# Patient Record
Sex: MALE | Race: WHITE | NOT HISPANIC OR LATINO | Employment: FULL TIME | URBAN - METROPOLITAN AREA
[De-identification: names, ages, dates, MRNs, and addresses within clinical notes are randomized per-mention and may not be internally consistent; named-entity substitution may affect disease eponyms.]

---

## 2020-04-04 ENCOUNTER — APPOINTMENT (OUTPATIENT)
Dept: GENERAL RADIOLOGY | Facility: CLINIC | Age: 65
End: 2020-04-04
Attending: EMERGENCY MEDICINE
Payer: COMMERCIAL

## 2020-04-04 ENCOUNTER — HOSPITAL ENCOUNTER (EMERGENCY)
Facility: CLINIC | Age: 65
Discharge: HOME | End: 2020-04-04
Attending: EMERGENCY MEDICINE | Admitting: EMERGENCY MEDICINE
Payer: COMMERCIAL

## 2020-04-04 VITALS
RESPIRATION RATE: 15 BRPM | HEART RATE: 81 BPM | DIASTOLIC BLOOD PRESSURE: 71 MMHG | SYSTOLIC BLOOD PRESSURE: 120 MMHG | TEMPERATURE: 99.8 F | OXYGEN SATURATION: 99 %

## 2020-04-04 DIAGNOSIS — R68.83 CHILLS: ICD-10-CM

## 2020-04-04 DIAGNOSIS — R05.9 COUGH: ICD-10-CM

## 2020-04-04 LAB
ALBUMIN SERPL-MCNC: 3.3 G/DL (ref 3.4–5)
ALP SERPL-CCNC: 121 U/L (ref 40–150)
ALT SERPL W P-5'-P-CCNC: 34 U/L (ref 0–70)
ANION GAP SERPL CALCULATED.3IONS-SCNC: 4 MMOL/L (ref 3–14)
AST SERPL W P-5'-P-CCNC: 22 U/L (ref 0–45)
BASOPHILS # BLD AUTO: 0 10E9/L (ref 0–0.2)
BASOPHILS NFR BLD AUTO: 0.2 %
BILIRUB SERPL-MCNC: 0.7 MG/DL (ref 0.2–1.3)
BUN SERPL-MCNC: 16 MG/DL (ref 7–30)
CALCIUM SERPL-MCNC: 9.2 MG/DL (ref 8.5–10.1)
CHLORIDE SERPL-SCNC: 103 MMOL/L (ref 94–109)
CO2 SERPL-SCNC: 27 MMOL/L (ref 20–32)
CREAT SERPL-MCNC: 0.83 MG/DL (ref 0.66–1.25)
DIFFERENTIAL METHOD BLD: NORMAL
EOSINOPHIL # BLD AUTO: 0 10E9/L (ref 0–0.7)
EOSINOPHIL NFR BLD AUTO: 0.2 %
ERYTHROCYTE [DISTWIDTH] IN BLOOD BY AUTOMATED COUNT: 12.2 % (ref 10–15)
GFR SERPL CREATININE-BSD FRML MDRD: >90 ML/MIN/{1.73_M2}
GLUCOSE SERPL-MCNC: 112 MG/DL (ref 70–99)
HCT VFR BLD AUTO: 50.6 % (ref 40–53)
HGB BLD-MCNC: 16.7 G/DL (ref 13.3–17.7)
IMM GRANULOCYTES # BLD: 0 10E9/L (ref 0–0.4)
IMM GRANULOCYTES NFR BLD: 0.5 %
LACTATE BLD-SCNC: 0.8 MMOL/L (ref 0.7–2)
LIPASE SERPL-CCNC: 97 U/L (ref 73–393)
LYMPHOCYTES # BLD AUTO: 0.8 10E9/L (ref 0.8–5.3)
LYMPHOCYTES NFR BLD AUTO: 13.3 %
MCH RBC QN AUTO: 30.7 PG (ref 26.5–33)
MCHC RBC AUTO-ENTMCNC: 33 G/DL (ref 31.5–36.5)
MCV RBC AUTO: 93 FL (ref 78–100)
MONOCYTES # BLD AUTO: 0.7 10E9/L (ref 0–1.3)
MONOCYTES NFR BLD AUTO: 11.1 %
NEUTROPHILS # BLD AUTO: 4.4 10E9/L (ref 1.6–8.3)
NEUTROPHILS NFR BLD AUTO: 74.7 %
NRBC # BLD AUTO: 0 10*3/UL
NRBC BLD AUTO-RTO: 0 /100
PLATELET # BLD AUTO: 226 10E9/L (ref 150–450)
POTASSIUM SERPL-SCNC: 4.2 MMOL/L (ref 3.4–5.3)
PROT SERPL-MCNC: 7.3 G/DL (ref 6.8–8.8)
RBC # BLD AUTO: 5.44 10E12/L (ref 4.4–5.9)
SODIUM SERPL-SCNC: 134 MMOL/L (ref 133–144)
WBC # BLD AUTO: 5.9 10E9/L (ref 4–11)

## 2020-04-04 PROCEDURE — 99285 EMERGENCY DEPT VISIT HI MDM: CPT | Mod: 25

## 2020-04-04 PROCEDURE — 85025 COMPLETE CBC W/AUTO DIFF WBC: CPT | Performed by: EMERGENCY MEDICINE

## 2020-04-04 PROCEDURE — 71045 X-RAY EXAM CHEST 1 VIEW: CPT

## 2020-04-04 PROCEDURE — 80053 COMPREHEN METABOLIC PANEL: CPT | Performed by: EMERGENCY MEDICINE

## 2020-04-04 PROCEDURE — 83605 ASSAY OF LACTIC ACID: CPT | Performed by: EMERGENCY MEDICINE

## 2020-04-04 PROCEDURE — 25800030 ZZH RX IP 258 OP 636: Performed by: EMERGENCY MEDICINE

## 2020-04-04 PROCEDURE — 96360 HYDRATION IV INFUSION INIT: CPT

## 2020-04-04 PROCEDURE — 83690 ASSAY OF LIPASE: CPT | Performed by: EMERGENCY MEDICINE

## 2020-04-04 PROCEDURE — 96361 HYDRATE IV INFUSION ADD-ON: CPT

## 2020-04-04 PROCEDURE — 25000132 ZZH RX MED GY IP 250 OP 250 PS 637: Performed by: EMERGENCY MEDICINE

## 2020-04-04 PROCEDURE — 93005 ELECTROCARDIOGRAM TRACING: CPT

## 2020-04-04 RX ORDER — ACETAMINOPHEN 325 MG/1
975 TABLET ORAL ONCE
Status: COMPLETED | OUTPATIENT
Start: 2020-04-04 | End: 2020-04-04

## 2020-04-04 RX ORDER — SODIUM CHLORIDE 9 MG/ML
1000 INJECTION, SOLUTION INTRAVENOUS CONTINUOUS
Status: DISCONTINUED | OUTPATIENT
Start: 2020-04-04 | End: 2020-04-04 | Stop reason: HOSPADM

## 2020-04-04 RX ADMIN — ACETAMINOPHEN 975 MG: 325 TABLET, FILM COATED ORAL at 12:39

## 2020-04-04 RX ADMIN — SODIUM CHLORIDE 1000 ML: 9 INJECTION, SOLUTION INTRAVENOUS at 12:39

## 2020-04-04 NOTE — ED PROVIDER NOTES
"  History     Chief Complaint:  Cough    The history is provided by the patient and the EMS personnel.      Sonu Holt is a 64 year old male smoker with a known lung mass who presents with productive cough with small green sputum that began on 4 days ago and associated chills. Patient reports he works as a  and had a contact with a co-worker who was also ill preceding his onset of symptoms. Patient reports that he began to then develop a decreased appetite and diarrhea and was up all night diaphoretic with chills. He also initially reported chest pain though secondary to the cough, but now states that overall, he's feeling better and no longer has a cough. As he lives with his daughter and grandchildren, he was prompted to present. He reports mild right lower quadrant tenderness and difficulty sleeping, but denies recent travel or shortness of breath. Of note, he denies any daily medications, but states he \"takes marijuana and beer.\"     Allergies:  No Known Allergies     Medications:    No Known Medications    Past Medical History:    Lung Mass     Past Surgical History:    Hernia Repair    Family History:    Sister(s): Breast Cancer  Brother(s): Rheumatic Fever, coronary artery disease    Social History:  The patient was accompanied to the ED by EMS.  Smoking Status: No  Smokeless Tobacco: Never  Alcohol Use: Yes  Drug Use: Marijuana  Marital Status:  Single [1]    Review of Systems   All other systems reviewed and are negative.      Physical Exam     Patient Vitals for the past 24 hrs:   BP Temp Temp src Pulse Heart Rate Resp SpO2   04/04/20 1416 120/71 -- -- 81 -- -- --   04/04/20 1400 -- -- -- -- -- -- 99 %   04/04/20 1316 -- -- -- -- 81 15 97 %   04/04/20 1315 125/83 -- -- 86 81 15 97 %   04/04/20 1300 123/86 -- -- 88 87 18 98 %   04/04/20 1215 130/80 99.8  F (37.7  C) Oral 97 -- 16 96 %       Physical Exam  General: Resting on the bed.  Head: No obvious trauma to head.  Ears, Nose, Throat:  " External ears normal.  Nose normal.  No pharyngeal erythema, swelling or exudate.  Midline uvula.    Eyes:  Conjunctivae clear.  Pupils are equal, round, and reactive.   Neck: Normal range of motion.  Neck supple.   CV: Regular rate and rhythm.  No murmurs.      Respiratory: Effort normal and breath sounds normal.  No wheezing or crackles.   Gastrointestinal: Soft.  No distension. There is no tenderness.  There is no rigidity, no rebound and no guarding.   Musculoskeletal: Non tender non edematous calves  Neuro: Alert. Moving all extremities appropriately.  Normal speech.    Skin: Skin is warm and dry.  No rash noted.       Emergency Department Course     ECG:  Indication: Cough  ECG taken at 1223, ECG read at 1225 by Dr. Daniel MD  Sinus rhythm with premature atrial complexes  Otherwise normal ECG  No significant changes compared to EKG dated 08/30/2016  Rate 93 bpm. WY interval 202. QRS duration 86. QT/QTc 342/425. P-R-T axes 51 11 18.    Imaging:  Radiology findings were communicated with the patient who voiced understanding of the findings.    XR Chest Port 1 View  IMPRESSION: No acute cardiopulmonary disease.  Reading per radiology    Laboratory:  Laboratory findings were communicated with the patient who voiced understanding of the findings.    CBC: AWNL (WBC 5.9, HGB 16.7, )  CMP: Glucose 112(H), Albumin 3.3(L) o/w WNL (Creatinine 0.83)  Lipase: 97  Lactic Acid (Resulted 1244): 0.8    Interventions:  (1239)   0.9% sodium chloride BOLUS 1000 mL IV    (1239)   Acetaminophen 975mg Oral    Emergency Department Course:  Past medical records, nursing notes, and vitals reviewed.    IV was inserted and blood was drawn for laboratory testing, results above.    (1221)   I performed an exam of the patient as documented above.     The patient was sent for a XR chest port 1 while in the emergency department, results above.    EKG obtained in the ED, see results above.     (1400)   I rechecked the patient and  discussed the results of his workup thus far. Plan of care was discussed and patient was discharged.    Findings and plan explained to the Patient. Patient discharged home with instructions regarding supportive care, medications, and reasons to return. The importance of close follow-up was reviewed.    I personally reviewed the laboratory and imaging results with the Patient and answered all related questions prior to discharge.     Impression & Plan     Medical Decision Makin-year-old male presents with cough and chills.  Vital signs initially low-grade temperature and mild tachycardia but this improved over the course of stay.  Broad differential was pursued including not limited to ACS, PE, pneumonia, pneumothorax, effusion, electrolyte, metabolic, renal dysfunction, hepatitis, pancreatitis, severe sepsis, septic shock, etc.  Overall patient has cough and chills.  He had some vague right lower quadrant abdominal tenderness but no reproducible pain on exam.  Abdominal labs were obtained.  CBC shows no leukocytosis or anemia.  BMP shows no acute electrolyte, metabolic, renal dysfunction.  LFTs and lipase are reassuring, not concerning for severe sepsis or septic shock.  Patient has no focal abdominal tenderness on exam to indicate need for CT scan or further imaging.  At this point I am reassured by exam and patient does not report any consistent or continued pain.  Chest x-ray shows no evidence acute pneumonia, pneumothorax or effusion.  Considered PE but no pleuritic chest pain, no hypoxia, no shortness of breath, no chest pain, does not appear consistent with PE especially in the setting of URI symptoms.  Considered ACS but EKG reassuring.  Additionally patient has no chest pain or shortness of breath.  No signs of acute ST-T wave changes.  Symptoms seem most consistent with URI.  Overall patient feels improved.  He was able to ambulate without hypoxia or tachycardia.  At this point we discussed  possibility of coronavirus and recommended self-isolation.  Coronavirus follow-up phone call was given and discharge paperwork.  Supportive cares are indicated.  Patient does not qualify for coronavirus testing.  At this point he does not require admission.  At this point I think that patient is recently safe for discharge.  Return precautions were discussed.  Patient was discharged home.    Diagnosis:    ICD-10-CM    1. Chills  R68.83 COVID-19 [facility] Referral   2. Cough  R05        Disposition:  Discharged to home.    Scribe Disclosure:  I, Matt Goodman, am serving as a scribe at 12:21 PM on 4/4/2020 to document services personally performed by Jeni Sanchez MD based on my observations and the provider's statements to me.   4/4/2020   [facility] EMERGENCY DEPARTMENT       Jeni Sanchez MD  04/04/20 7522

## 2020-04-04 NOTE — DISCHARGE INSTRUCTIONS
Please return to the ED if you have worsening cough, fevers >101, chest pain, shortness of breath, intractable vomiting, or other acute changes.  Please follow up with your PCP in the next 2-3 days.      Use tylenol and ibuprofen for pain.  Drink plenty of fluids and rest.      Discharge Instructions  COVID-19    Your Provider has determined that you should practice self-isolation and self-monitoring in order to protect yourself and your community from COVID-19, which is the disease caused by a new coronavirus. The virus spreads from person to person primarily by droplets when an infected person coughs or sneezes and the droplet either lands on another person or that other person touches a surface with the droplet on it. Diagnosis of COVID-19 can be made with a test but many times the test is unavailable or not necessary. There is no specific treatment or medicine for the disease.    Symptoms of COVID-19  Many people have no symptoms or mild symptoms.  Symptoms may usually appear 4 to 5 days (up to 14 days) after contact with another ill person. Some people will get severe symptoms and pneumonia. Usual symptoms are:     Fever    Cough    Trouble breathing    Less common symptoms are: Headache, body aches, sore throat, sneezing,               diarrhea.    How to Care for Yourself    Stay home  Most people will recover from illness with mild symptoms.  Isolation by staying home is the best method to prevent the spread of the illness. Do not go to work or school. Have a friend or relative do your shopping. Do not use public transportation (bus, train) or ridesharing (Lyft, Uber).    How long should I stay home?    If you have symptoms of a respiratory disease (fever, cough), you should stay home for at least 7 days, and for 3 days with no fever and improvement of respiratory symptoms--whichever is longer. (Your fever should be gone for 3 days without using fever-reducing medicine.)  For example, if you have a fever and  coughing for 4 days, you need to stay home 3 more days with no fever for a total of 7 days. Or, if you have a fever and coughing for 5 days, you need to stay home 3 more days with no fever for a total of 8 days.    Separate yourself from other people in your home.? As much as possible, you should stay in one room and away from other people in your home. Also, use a separate bathroom, if possible. Avoid handling pets or other animals while sick.     Wear a facemask: if you need to be around other people and cover your mouth and nose with a tissue when you cough or sneeze.     Avoid sharing personal household items. You should not share dishes, drinking glasses, forks/knives/spoons, towels, or bedding with other people in your home. After using these items, they should be washed with soap and water. Clean parts of your home that are touched often (doorknobs, faucets, countertops, etc.) daily.     Wash your hands often with soap and water for at least 20 seconds or use an alcohol-based hand  containing at least 60% alcohol.     Avoid touching your face    Treat your symptoms: Take over the counter medications like ibuprofen (Advil or Motrin) or Acetaminophen (Tylenol). Drink fluids. Rest.    Watch for worsening symptoms: shortness of breath, or difficulty breathing.    Return to the Emergency Department if:    If you are developing worsening breathing, shortness of breath, or feel worse you should seek medical attention.  If you are uncertain, contact your health care provider/clinic. If you need emergency medical attention, call 911 and tell them you have been ill.    Discharge Instructions  Bronchitis, Pneumonia, Bronchospasm    You were seen today for a chest infection or inflammation. If your provider decided this was due to a bacterial infection, you may need an antibiotic. Sometimes these are caused by a virus, and then an antibiotic will not help.     Generally, every Emergency Department visit should  have a follow-up clinic visit with either a primary or a specialty clinic/provider. Please follow-up as instructed by your emergency provider today.    Return to the Emergency Department if:  Your breathing gets much worse.  You are very weak, or feel much more ill.  You develop new symptoms, such as chest pain.  You cough up blood.  You are vomiting (throwing up) enough that you cannot keep fluids or your medicine down.    What can I do to help myself?  Fill any prescriptions the provider gave you and take them right away--especially antibiotics. Be sure to finish the whole antibiotic prescription.  You may be given a prescription for an inhaler, which can help loosen tight air passages.  Use this as needed, but not more often than directed. Inhalers work much better when used with a spacer.   You may be given a prescription for a steroid to reduce inflammation. Used long-term, these can have side effects, but for short-term use they are safe. You may notice restlessness or increased appetite.      You may use non-prescription cough or cold medicines. Cough medicines may help, but don t make the cough go away completely.   Avoid smoke, because this can make your symptoms worse. If you smoke, this may be a good time to quit! Consider using nicotine lozenges, gum, or patches to reduce cravings.   If you have a fever, Tylenol  (acetaminophen), Motrin  (ibuprofen), or Advil  (ibuprofen) may help bring fever down and may help you feel more comfortable. Be sure to read and follow the package directions, and ask your provider if you have questions.  Be sure to get your flu shot each year.  For certain ages, the pneumonia shot can help prevent pneumonia.  If you were given a prescription for medicine here today, be sure to read all of the information (including the package insert) that comes with your prescription.  This will include important information about the medicine, its side effects, and any warnings that you need  to know about.  The pharmacist who fills the prescription can provide more information and answer questions you may have about the medicine.  If you have questions or concerns that the pharmacist cannot address, please call or return to the Emergency Department.     Remember that you can always come back to the Emergency Department if you are not able to see your regular provider in the amount of time listed above, if you get any new symptoms, or if there is anything that worries you.

## 2020-04-04 NOTE — ED NOTES
Ambulated pt with pulse ox. Pt walked well through ER, quick pace, steady.  Denies SOB, dizziness,weakness. O2 maintained at 98%.

## 2020-04-04 NOTE — ED TRIAGE NOTES
"Patient arrived per EMS with 5 days of small green productive cough and complaints of \"chills\". Patient states that he had diarrhea yesterday, no appetite. Patient states he hasn't been able to sleep. Some right sided chest pain with cough along with RLQ abdominal pain. Increased fatigue.  "

## 2020-04-04 NOTE — ED NOTES
Bed: ED15  Expected date: 4/4/20  Expected time:   Means of arrival: Ambulance  Comments:  belgica Colby

## 2020-04-04 NOTE — ED AVS SNAPSHOT
[facility] Emergency Department  [address]  [facility] [address]  Phone:  [phone]  Fax:  [phone]                                    Sonu Holt   MRN: [id number]    Department:  [facility] Emergency Department   Date of Visit:  4/4/2020           After Visit Summary Signature Page    I have received my discharge instructions, and my questions have been answered. I have discussed any challenges I see with this plan with the nurse or doctor.    ..........................................................................................................................................  Patient/Patient Representative Signature      ..........................................................................................................................................  Patient Representative Print Name and Relationship to Patient    ..................................................               ................................................  Date                                   Time    ..........................................................................................................................................  Reviewed by Signature/Title    ...................................................              ..............................................  Date                                               Time          22EPIC Rev 08/18

## 2020-04-05 LAB — INTERPRETATION ECG - MUSE: NORMAL

## 2020-04-06 ENCOUNTER — NURSE TRIAGE (OUTPATIENT)
Dept: NURSING | Facility: CLINIC | Age: 65
End: 2020-04-06

## 2020-04-06 NOTE — TELEPHONE ENCOUNTER
Pt states he was told to schedule an appointment with a FP provider on 4/7/2020 following an ED visit on 04/04/2020.  Pt feels better today, no new symptoms.      Disposition:  Schedule a telephone visit with clinic provider for 04/07/2020.  He verbalized understanding and had no further questions. Call transferred to scheduling.     Geraldine Geller RN/TERRIE    Reason for Disposition    Requesting regular office appointment    Additional Information    Negative: Lab calling with strep throat test results and triager can call in prescription    Negative: Lab calling with urinalysis test results and triager can call in prescription    Negative: Medication questions    Negative: ED call to PCP    Negative: Physician call to PCP    Negative: Call about patient who is currently hospitalized    Negative: Lab or radiology calling with CRITICAL test results    Negative: [1] Prescription not at pharmacy AND [2] was prescribed today by PCP    Negative: [1] Follow-up call from patient regarding patient's clinical status AND [2] information urgent    Negative: [1] Caller requests to speak ONLY to PCP AND [2] URGENT question    Negative: [1] Caller requests to speak to PCP now AND [2] won't tell us reason for call  (Exception: if 10 pm to 6 am, caller must first discuss reason for the call)    Negative: Notification of hospital admission    Negative: Notification of death    Negative: Caller requesting lab results    Negative: Lab or radiology calling with test results    Negative: [1] Follow-up call from patient regarding patient's clinical status AND [2] information NON-URGENT    Negative: [1] Caller requests to speak ONLY to PCP AND [2] NON-URGENT question    Negative: Caller requesting an appointment, triage offered and declined    Negative: [1] Other NON-URGENT information for PCP AND [2] does not require PCP response    Negative: [1] Caller is not with the adult (patient) AND [2] reporting urgent symptoms    Negative: Lab  result questions    Negative: Medication questions    Negative: Caller can't be reached by phone    Negative: Caller has already spoken to PCP or another triager    Negative: RN needs further essential information from caller in order to complete triage    Protocols used: INFORMATION ONLY CALL-A-AH, PCP CALL - NO TRIAGE-A-AH

## 2020-04-07 ENCOUNTER — VIRTUAL VISIT (OUTPATIENT)
Dept: FAMILY MEDICINE | Facility: CLINIC | Age: 65
End: 2020-04-07
Payer: COMMERCIAL

## 2020-04-07 DIAGNOSIS — R05.9 COUGH: Primary | ICD-10-CM

## 2020-04-07 PROCEDURE — 99202 OFFICE O/P NEW SF 15 MIN: CPT | Mod: TEL | Performed by: FAMILY MEDICINE

## 2020-04-07 ASSESSMENT — ENCOUNTER SYMPTOMS
FEVER: 0
COUGH: 0
SHORTNESS OF BREATH: 0

## 2020-04-07 NOTE — PROGRESS NOTES
"Subjective     Sonu Holt is a 64 year old male who is being evaluated via a billable telephone visit.      The patient has been notified of following:     \"This telephone visit will be conducted via a call between you and your physician/provider. We have found that certain health care needs can be provided without the need for a physical exam.  This service lets us provide the care you need with a short phone conversation.  If a prescription is necessary we can send it directly to your pharmacy.  If lab work is needed we can place an order for that and you can then stop by our lab to have the test done at a later time.    If during the course of the call the physician/provider feels a telephone visit is not appropriate, you will not be charged for this service.\"     Patient has given verbal consent for Telephone visit?  Yes    Sonu Holt complains of No chief complaint on file.      ALLERGIES  Patient has no known allergies.    ED/UC Followup:    Facility: [facility]  Date of visit: 4/4/20  Reason for visit: cough and chills  Current Status: is better       Symptoms resolved. No additional concerns.  States he just had a bad night; got concerned for chest pain and cough. Negative ACS and pneumonia workup. Daily alcohol drinker, 3-5 a day; denies symptoms of dizziness, confusion, agitation when not drinking.  Occasional marijuana user.    Reviewed and updated as needed this visit by Provider         Review of Systems   Constitutional: Negative for fever.   Respiratory: Negative for cough and shortness of breath.    Cardiovascular: Negative for chest pain.          Objective     No conversational dyspnea.  Clear and coherent.    Diagnostic Test Results:  Labs reviewed in Epic        Assessment/Plan:   1. Cough  Resolved.  Advised to continue to monitor symptoms and if worse, contact us to let us know.  Otherwise encouraged to get preventative annual exam later in the year.       Return in about 5 days (around " 4/12/2020) for If symptoms do not improve or gets worse..      Phone call duration:  7 minutes    Mark Timmons MD

## 2021-07-08 PROCEDURE — 99285 EMERGENCY DEPT VISIT HI MDM: CPT | Mod: 25

## 2021-07-08 PROCEDURE — 96374 THER/PROPH/DIAG INJ IV PUSH: CPT

## 2021-07-08 PROCEDURE — 96375 TX/PRO/DX INJ NEW DRUG ADDON: CPT

## 2021-07-08 PROCEDURE — 96361 HYDRATE IV INFUSION ADD-ON: CPT

## 2021-07-09 ENCOUNTER — APPOINTMENT (OUTPATIENT)
Dept: CT IMAGING | Facility: CLINIC | Age: 66
End: 2021-07-09
Attending: EMERGENCY MEDICINE
Payer: COMMERCIAL

## 2021-07-09 ENCOUNTER — HOSPITAL ENCOUNTER (EMERGENCY)
Facility: CLINIC | Age: 66
Discharge: HOME | End: 2021-07-09
Attending: EMERGENCY MEDICINE | Admitting: EMERGENCY MEDICINE
Payer: COMMERCIAL

## 2021-07-09 VITALS
OXYGEN SATURATION: 100 % | RESPIRATION RATE: 20 BRPM | HEART RATE: 76 BPM | DIASTOLIC BLOOD PRESSURE: 87 MMHG | TEMPERATURE: 98.4 F | SYSTOLIC BLOOD PRESSURE: 113 MMHG

## 2021-07-09 DIAGNOSIS — R31.9 URINARY TRACT INFECTION WITH HEMATURIA, SITE UNSPECIFIED: ICD-10-CM

## 2021-07-09 DIAGNOSIS — K80.80 BILIARY CALCULUS OF OTHER SITE WITHOUT OBSTRUCTION: ICD-10-CM

## 2021-07-09 DIAGNOSIS — N39.0 URINARY TRACT INFECTION WITH HEMATURIA, SITE UNSPECIFIED: ICD-10-CM

## 2021-07-09 LAB
ALBUMIN SERPL-MCNC: 4 G/DL (ref 3.4–5)
ALBUMIN UR-MCNC: 10 MG/DL
ALP SERPL-CCNC: 68 U/L (ref 40–150)
ALT SERPL W P-5'-P-CCNC: 38 U/L (ref 0–70)
ANION GAP SERPL CALCULATED.3IONS-SCNC: 3 MMOL/L (ref 3–14)
APPEARANCE UR: CLEAR
AST SERPL W P-5'-P-CCNC: 24 U/L (ref 0–45)
BASOPHILS # BLD AUTO: 0 10E9/L (ref 0–0.2)
BASOPHILS NFR BLD AUTO: 0.3 %
BILIRUB DIRECT SERPL-MCNC: 0.1 MG/DL (ref 0–0.2)
BILIRUB SERPL-MCNC: 0.5 MG/DL (ref 0.2–1.3)
BILIRUB UR QL STRIP: NEGATIVE
BUN SERPL-MCNC: 14 MG/DL (ref 7–30)
CALCIUM SERPL-MCNC: 9.2 MG/DL (ref 8.5–10.1)
CHLORIDE SERPL-SCNC: 107 MMOL/L (ref 94–109)
CO2 SERPL-SCNC: 29 MMOL/L (ref 20–32)
COLOR UR AUTO: YELLOW
CREAT SERPL-MCNC: 0.87 MG/DL (ref 0.66–1.25)
DIFFERENTIAL METHOD BLD: NORMAL
EOSINOPHIL # BLD AUTO: 0 10E9/L (ref 0–0.7)
EOSINOPHIL NFR BLD AUTO: 0.4 %
ERYTHROCYTE [DISTWIDTH] IN BLOOD BY AUTOMATED COUNT: 13 % (ref 10–15)
GFR SERPL CREATININE-BSD FRML MDRD: >90 ML/MIN/{1.73_M2}
GLUCOSE SERPL-MCNC: 123 MG/DL (ref 70–99)
GLUCOSE UR STRIP-MCNC: NEGATIVE MG/DL
HCT VFR BLD AUTO: 46 % (ref 40–53)
HGB BLD-MCNC: 14.9 G/DL (ref 13.3–17.7)
HGB UR QL STRIP: NEGATIVE
IMM GRANULOCYTES # BLD: 0 10E9/L (ref 0–0.4)
IMM GRANULOCYTES NFR BLD: 0.3 %
KETONES UR STRIP-MCNC: 10 MG/DL
LEUKOCYTE ESTERASE UR QL STRIP: ABNORMAL
LIPASE SERPL-CCNC: 75 U/L (ref 73–393)
LYMPHOCYTES # BLD AUTO: 0.9 10E9/L (ref 0.8–5.3)
LYMPHOCYTES NFR BLD AUTO: 9.6 %
MCH RBC QN AUTO: 31.9 PG (ref 26.5–33)
MCHC RBC AUTO-ENTMCNC: 32.4 G/DL (ref 31.5–36.5)
MCV RBC AUTO: 99 FL (ref 78–100)
MONOCYTES # BLD AUTO: 0.5 10E9/L (ref 0–1.3)
MONOCYTES NFR BLD AUTO: 5.4 %
MUCOUS THREADS #/AREA URNS LPF: PRESENT /LPF
NEUTROPHILS # BLD AUTO: 7.9 10E9/L (ref 1.6–8.3)
NEUTROPHILS NFR BLD AUTO: 84 %
NITRATE UR QL: NEGATIVE
NRBC # BLD AUTO: 0 10*3/UL
NRBC BLD AUTO-RTO: 0 /100
PH UR STRIP: 6 PH (ref 5–7)
PLATELET # BLD AUTO: 202 10E9/L (ref 150–450)
POTASSIUM SERPL-SCNC: 4.2 MMOL/L (ref 3.4–5.3)
PROT SERPL-MCNC: 6.9 G/DL (ref 6.8–8.8)
RBC # BLD AUTO: 4.67 10E12/L (ref 4.4–5.9)
RBC #/AREA URNS AUTO: 9 /HPF (ref 0–2)
SODIUM SERPL-SCNC: 139 MMOL/L (ref 133–144)
SOURCE: ABNORMAL
SP GR UR STRIP: 1.03 (ref 1–1.03)
SQUAMOUS #/AREA URNS AUTO: 2 /HPF (ref 0–1)
UROBILINOGEN UR STRIP-MCNC: NORMAL MG/DL (ref 0–2)
WBC # BLD AUTO: 9.4 10E9/L (ref 4–11)
WBC #/AREA URNS AUTO: 10 /HPF (ref 0–5)

## 2021-07-09 PROCEDURE — 81001 URINALYSIS AUTO W/SCOPE: CPT | Performed by: EMERGENCY MEDICINE

## 2021-07-09 PROCEDURE — 87086 URINE CULTURE/COLONY COUNT: CPT | Performed by: EMERGENCY MEDICINE

## 2021-07-09 PROCEDURE — 258N000003 HC RX IP 258 OP 636: Performed by: EMERGENCY MEDICINE

## 2021-07-09 PROCEDURE — 83690 ASSAY OF LIPASE: CPT | Performed by: EMERGENCY MEDICINE

## 2021-07-09 PROCEDURE — 85025 COMPLETE CBC W/AUTO DIFF WBC: CPT | Performed by: EMERGENCY MEDICINE

## 2021-07-09 PROCEDURE — 74176 CT ABD & PELVIS W/O CONTRAST: CPT

## 2021-07-09 PROCEDURE — 250N000011 HC RX IP 250 OP 636: Performed by: EMERGENCY MEDICINE

## 2021-07-09 PROCEDURE — 80076 HEPATIC FUNCTION PANEL: CPT | Performed by: EMERGENCY MEDICINE

## 2021-07-09 PROCEDURE — 80048 BASIC METABOLIC PNL TOTAL CA: CPT | Performed by: EMERGENCY MEDICINE

## 2021-07-09 RX ORDER — OXYCODONE HYDROCHLORIDE 5 MG/1
5 TABLET ORAL EVERY 6 HOURS PRN
Qty: 8 TABLET | Refills: 0 | Status: SHIPPED | OUTPATIENT
Start: 2021-07-09 | End: 2023-02-10

## 2021-07-09 RX ORDER — HYDROMORPHONE HYDROCHLORIDE 1 MG/ML
0.5 INJECTION, SOLUTION INTRAMUSCULAR; INTRAVENOUS; SUBCUTANEOUS ONCE
Status: COMPLETED | OUTPATIENT
Start: 2021-07-09 | End: 2021-07-09

## 2021-07-09 RX ORDER — CEPHALEXIN 500 MG/1
500 CAPSULE ORAL 2 TIMES DAILY
Qty: 20 CAPSULE | Refills: 0 | Status: SHIPPED | OUTPATIENT
Start: 2021-07-09 | End: 2021-07-19

## 2021-07-09 RX ORDER — ONDANSETRON 2 MG/ML
4 INJECTION INTRAMUSCULAR; INTRAVENOUS ONCE
Status: COMPLETED | OUTPATIENT
Start: 2021-07-09 | End: 2021-07-09

## 2021-07-09 RX ORDER — LIDOCAINE HYDROCHLORIDE 10 MG/ML
INJECTION, SOLUTION EPIDURAL; INFILTRATION; INTRACAUDAL; PERINEURAL
Status: DISCONTINUED
Start: 2021-07-09 | End: 2021-07-09 | Stop reason: HOSPADM

## 2021-07-09 RX ORDER — KETOROLAC TROMETHAMINE 15 MG/ML
10 INJECTION, SOLUTION INTRAMUSCULAR; INTRAVENOUS ONCE
Status: COMPLETED | OUTPATIENT
Start: 2021-07-09 | End: 2021-07-09

## 2021-07-09 RX ORDER — ONDANSETRON 4 MG/1
4 TABLET, ORALLY DISINTEGRATING ORAL EVERY 8 HOURS PRN
Qty: 10 TABLET | Refills: 0 | Status: SHIPPED | OUTPATIENT
Start: 2021-07-09 | End: 2021-07-12

## 2021-07-09 RX ADMIN — HYDROMORPHONE HYDROCHLORIDE 0.5 MG: 1 INJECTION, SOLUTION INTRAMUSCULAR; INTRAVENOUS; SUBCUTANEOUS at 02:31

## 2021-07-09 RX ADMIN — SODIUM CHLORIDE 1000 ML: 9 INJECTION, SOLUTION INTRAVENOUS at 02:30

## 2021-07-09 RX ADMIN — KETOROLAC TROMETHAMINE 10 MG: 15 INJECTION, SOLUTION INTRAMUSCULAR; INTRAVENOUS at 00:59

## 2021-07-09 RX ADMIN — ONDANSETRON 4 MG: 2 INJECTION INTRAMUSCULAR; INTRAVENOUS at 02:30

## 2021-07-09 ASSESSMENT — ENCOUNTER SYMPTOMS
FEVER: 1
NAUSEA: 0
SHORTNESS OF BREATH: 0
FLANK PAIN: 1
NUMBNESS: 0
VOMITING: 0
DYSURIA: 0
DIARRHEA: 0

## 2021-07-09 NOTE — ED PROVIDER NOTES
History   Chief Complaint:  Flank Pain       The history is provided by the patient.      Sonu Holt is an otherwise healthy 66 year old male who presents for evaluation of left flank pain. Sonu developed left flank pain which radiates to the groin yesterday at midnight which kept him up. Pain is intermittent and nothing makes it better or worse. He took ibuprofen, Aleve, and muscle relaxers without relief. He felt somewhat feverish with no measured fever. No nausea or vomiting. No chest pain, shortness of breath, diarrhea, or dysuria. No leg numbness or tingling. No history kidney stones.    Review of Systems   Constitutional: Positive for fever.   Respiratory: Negative for shortness of breath.    Cardiovascular: Negative for chest pain.   Gastrointestinal: Negative for diarrhea, nausea and vomiting.   Genitourinary: Positive for flank pain. Negative for dysuria.   Neurological: Negative for numbness.   All other systems reviewed and are negative.      Allergies:  No Known Allergies    Medications:  The patient is currently on no regular medications.     Past Medical History:    Lung mass    Past Surgical History:    Hernia repair    Family History:    Breast cancer  Coronary artery disease    Social History:  Presents unaccompanied   PCP: No Ref-Primary, Physician     Physical Exam     Patient Vitals for the past 24 hrs:   BP Temp Temp src Pulse Resp SpO2   07/09/21 0300 -- -- -- 61 -- 100 %   07/09/21 0245 113/87 -- -- 81 -- 97 %   07/09/21 0230 (!) 132/101 -- -- -- -- --   07/08/21 2218 (!) 139/93 98.4  F (36.9  C) Oral 90 20 98 %       Physical Exam  Nursing note and vitals reviewed.  Constitutional: Well nourished.   Eyes: Conjunctiva normal.  Pupils are equal, round, and reactive to light.   ENT: Nose normal. Mucous membranes pink and moist.    Neck: Normal range of motion.  CVS: Normal rate, regular rhythm.  Normal heart sounds.  No murmur.  Pulmonary: Lungs clear to auscultation bilaterally. No  wheezes/rales/rhonchi.  GI: Abdomen soft. Nontender, nondistended. No rigidity or guarding.  Mild L. CVA tenderness    MSK: No calf tenderness or swelling.  Neuro: Alert. Follows simple commands.  Skin: Skin is warm and dry. No rash noted.   Psychiatric: Normal affect.       Emergency Department Course     Imaging:  CT Abdomen Pelvis w/o IV contrast - Stone Protocol:   1.  No hydronephrosis or calcified, obstructing urinary tract stone. The bladder is empty.   2.  Moderate colonic diverticulosis.   3.  Cholelithiasis.  Per radiology.    Laboratory:  CBC: WBC 9.4, HGB 14.9,    BMP: Glucose: 123(H), o/w WNL (Creatinine 0.87)   Hepatic all WNL    Lipase: 75    UA with Microscopic: Urineketon: 10, Protein Albumin: 10, Leukocyte Esterase: small, WBC: 10(H), RBC: 9(H), Squamous Epithelial: 2(H), Mucous: present, o/w WNL   Urine Culture: In process     Emergency Department Course:    Reviewed:  I reviewed nursing notes, vitals, past medical history and care everywhere    Assessments:  0210 I obtained history and examined the patient as noted above.   0415 I rechecked the patient and explained findings.     Interventions:  0059 Toradol, 10 mg, IV  0230 0.9% sodium chloride bolus, 1,000 ml, IV  0230 Zofran, 4 mg, IV  0231 Dilaudid, 0.5 mg, IV    Disposition:  The patient was discharged to home.       Impression & Plan     Medical Decision Making:  Patient is a 66-year-old male presenting with predominately left flank pain.  He is nontoxic, clinically well-hydrated on arrival.  He did undergo formal CT imaging which is fortunately without evidence of obstructive uropathy.  Incidental cholelithiasis as well as diverticulosis identified and discussed with the patient.  He has no reproducible right upper quadrant tenderness on exam. No other intraabdominal catastrophe identified. UA does suggest concerns for possible infection.  Urine culture sent.  Given his presentation I do have concerns for possible early  pyelonephritis.  He feels comfortable with plans for close outpatient follow-up.  His pain was controlled during his time in the ED.  I will initiate antibiotics on dispo as well as analgesia and ODT Zofran.  We did discuss recommendation for close outpatient follow-up and to return for fever, worsening pain, intractable vomiting or should symptoms worsen or change.      Covid-19  Sonu Holt was evaluated during a global COVID-19 pandemic, which necessitated consideration that the patient might be at risk for infection with the SARS-CoV-2 virus that causes COVID-19.   Applicable protocols for evaluation were followed during the patient's care.   COVID-19 was considered as part of the patient's evaluation.     Diagnosis:    ICD-10-CM    1. Urinary tract infection with hematuria, site unspecified  N39.0     R31.9     concern for early pyelonephritis   2. Biliary calculus of other site without obstruction  K80.80        Discharge Medications:  New Prescriptions    CEPHALEXIN (KEFLEX) 500 MG CAPSULE    Take 1 capsule (500 mg) by mouth 2 times daily for 10 days    ONDANSETRON (ZOFRAN ODT) 4 MG ODT TAB    Take 1 tablet (4 mg) by mouth every 8 hours as needed for nausea    OXYCODONE (ROXICODONE) 5 MG TABLET    Take 1 tablet (5 mg) by mouth every 6 hours as needed for pain       Scribe Disclosure:  Stefanie ROSS, am serving as a scribe at 2:04 AM on 7/9/2021 to document services personally performed by Lydia Fallon DO based on my observations and the provider's statements to me.              Lydia Fallon DO  07/09/21 0877

## 2021-07-09 NOTE — RESULT ENCOUNTER NOTE
[facility] Emergency Dept discharge antibiotic (if prescribed): Cephalexin (Keflex) 500 mg capsule, 1 capsule (500 mg) by mouth 2 times daily for 10 days.  Date of Rx (if applicable):  7/9/21  No changes in treatment per [facility] ED Lab Result Urine culture protocol.

## 2021-07-09 NOTE — ED TRIAGE NOTES
Left flank pain radiating to abdomen and groin for over one day, worse tonight. ABCs intact GCS 15

## 2021-07-10 ENCOUNTER — TELEPHONE (OUTPATIENT)
Dept: EMERGENCY MEDICINE | Facility: CLINIC | Age: 66
End: 2021-07-10

## 2021-07-10 LAB
BACTERIA SPEC CULT: NORMAL
Lab: NORMAL
SPECIMEN SOURCE: NORMAL

## 2021-07-10 NOTE — TELEPHONE ENCOUNTER
"[facility] Emergency Department Lab result notification:    Reason for call  Notify of lab result  Lab Result  Final urine culture report is negative.  Adult Negative Urine culture parameters per protocol: Any # Urogenital single or mixed organism  [facility] Emergency Dept discharge antibiotic prescribed (If applicable): Cephalexin (Keflex) 500 mg capsule, 1 capsule (500 mg) by mouth 2 times daily for 10 days.  Treatment recommendations per [facility] ED Lab Result Urine Culture protocol.   ED visit Date: 7/9/21  Symptoms reported at ED visit ank Pain        The history is provided by the patient.      Sonu Holt is an otherwise healthy 66 year old male who presents for evaluation of left flank pain. Sonu developed left flank pain which radiates to the groin yesterday at midnight which kept him up. Pain is intermittent and nothing makes it better or worse. He took ibuprofen, Aleve, and muscle relaxers without relief. He felt somewhat feverish with no measured fever. No nausea or vomiting. No chest pain, shortness of breath, diarrhea, or dysuria. No leg numbness or tingling. No history kidney stones.   Miscellaneous information      Current symptoms  3:46PM; Spoke with patient. He states he is feeling much better. \"I was about crying when I was in the ER\". C/O intermittent pain in the testicle. Denies any urinary symptoms, no frequency, urgency or pain with urination.   Recommendations/Instructions  Patient notified of lab result and treatment recommendations.   With his intermittent testicular pain the patient was advised to follow up with his PCP to discuss if he should continue taking the antibiotic or stop it.   The patient is comfortable with the information given and has no further questions.   He was transferred to [facility] to make a clinic appointment.     Contact your PCP clinic or return to the Emergency department if your:    Symptoms worsen or other concerning " symptom's.    Annalisa Mishra RN  [facility]  Emergency Dept Lab Result RN  Ph# [phone]     Copy of Lab result   Urine Culture Aerobic Bacterial  Order: [id number]  Status:  Final result   Visible to patient:  No (inaccessible in MyChart) Dx:  Urinary tract infection with hematuri...  Specimen Information: Midstream Urine        Component 1d ago   Specimen Description Midstream Urine    Special Requests Specimen received in preservative    Culture Micro 10,000 to 50,000 colonies/mL   mixed urogenital sabino   Susceptibility testing not routinely done    [facility]         Specimen Collected: 07/09/21  2:27 AM Last Resulted: 07/10/21  9:33 AM

## 2021-07-12 ENCOUNTER — OFFICE VISIT (OUTPATIENT)
Dept: FAMILY MEDICINE | Facility: CLINIC | Age: 66
End: 2021-07-12
Payer: COMMERCIAL

## 2021-07-12 ENCOUNTER — NURSE TRIAGE (OUTPATIENT)
Dept: NURSING | Facility: CLINIC | Age: 66
End: 2021-07-12

## 2021-07-12 VITALS
TEMPERATURE: 98.6 F | SYSTOLIC BLOOD PRESSURE: 110 MMHG | HEIGHT: 67 IN | BODY MASS INDEX: 27.62 KG/M2 | WEIGHT: 176 LBS | HEART RATE: 64 BPM | DIASTOLIC BLOOD PRESSURE: 62 MMHG | RESPIRATION RATE: 18 BRPM

## 2021-07-12 DIAGNOSIS — R73.9 HYPERGLYCEMIA: ICD-10-CM

## 2021-07-12 DIAGNOSIS — R20.0 NUMBNESS OF LEFT ANTERIOR THIGH: ICD-10-CM

## 2021-07-12 DIAGNOSIS — R10.32 ABDOMINAL PAIN, LEFT LOWER QUADRANT: ICD-10-CM

## 2021-07-12 DIAGNOSIS — N45.1 LEFT EPIDIDYMITIS: ICD-10-CM

## 2021-07-12 LAB
ALBUMIN UR-MCNC: NEGATIVE MG/DL
APPEARANCE UR: CLEAR
BASOPHILS # BLD AUTO: 0 10E3/UL (ref 0–0.2)
BASOPHILS NFR BLD AUTO: 0 %
BILIRUB UR QL STRIP: NEGATIVE
COLOR UR AUTO: YELLOW
EOSINOPHIL # BLD AUTO: 0.2 10E3/UL (ref 0–0.7)
EOSINOPHIL NFR BLD AUTO: 2 %
ERYTHROCYTE [DISTWIDTH] IN BLOOD BY AUTOMATED COUNT: 13 % (ref 10–15)
GLUCOSE UR STRIP-MCNC: NEGATIVE MG/DL
HBA1C MFR BLD: 5.3 % (ref 0–5.6)
HCT VFR BLD AUTO: 46.6 % (ref 40–53)
HGB BLD-MCNC: 15.3 G/DL (ref 13.3–17.7)
HGB UR QL STRIP: NEGATIVE
KETONES UR STRIP-MCNC: NEGATIVE MG/DL
LEUKOCYTE ESTERASE UR QL STRIP: NEGATIVE
LYMPHOCYTES # BLD AUTO: 1.3 10E3/UL (ref 0.8–5.3)
LYMPHOCYTES NFR BLD AUTO: 19 %
MCH RBC QN AUTO: 31.4 PG (ref 26.5–33)
MCHC RBC AUTO-ENTMCNC: 32.8 G/DL (ref 31.5–36.5)
MCV RBC AUTO: 96 FL (ref 78–100)
MONOCYTES # BLD AUTO: 0.8 10E3/UL (ref 0–1.3)
MONOCYTES NFR BLD AUTO: 11 %
NEUTROPHILS # BLD AUTO: 4.8 10E3/UL (ref 1.6–8.3)
NEUTROPHILS NFR BLD AUTO: 68 %
NITRATE UR QL: NEGATIVE
PH UR STRIP: 7.5 [PH] (ref 5–7)
PLATELET # BLD AUTO: 238 10E3/UL (ref 150–450)
RBC # BLD AUTO: 4.87 10E6/UL (ref 4.4–5.9)
SP GR UR STRIP: 1.02 (ref 1–1.03)
UROBILINOGEN UR STRIP-ACNC: 1 E.U./DL
WBC # BLD AUTO: 7 10E3/UL (ref 4–11)

## 2021-07-12 PROCEDURE — 83036 HEMOGLOBIN GLYCOSYLATED A1C: CPT | Performed by: FAMILY MEDICINE

## 2021-07-12 PROCEDURE — 81003 URINALYSIS AUTO W/O SCOPE: CPT | Performed by: FAMILY MEDICINE

## 2021-07-12 PROCEDURE — 85025 COMPLETE CBC W/AUTO DIFF WBC: CPT | Performed by: FAMILY MEDICINE

## 2021-07-12 PROCEDURE — 36415 COLL VENOUS BLD VENIPUNCTURE: CPT | Performed by: FAMILY MEDICINE

## 2021-07-12 PROCEDURE — 99214 OFFICE O/P EST MOD 30 MIN: CPT | Performed by: FAMILY MEDICINE

## 2021-07-12 RX ORDER — SULFAMETHOXAZOLE/TRIMETHOPRIM 800-160 MG
1 TABLET ORAL 2 TIMES DAILY
Qty: 20 TABLET | Refills: 0 | Status: SHIPPED | OUTPATIENT
Start: 2021-07-12 | End: 2023-02-10

## 2021-07-12 ASSESSMENT — MIFFLIN-ST. JEOR: SCORE: 1541.96

## 2021-07-12 NOTE — PROGRESS NOTES
"    Assessment & Plan   See after visit summary for helpful information and advice given to patient.    Left epididymitis  Patient advised to start new antibiotic treatment if not complete resolution of current genital symptoms.    - sulfamethoxazole-trimethoprim (BACTRIM DS) 800-160 MG tablet  Dispense: 20 tablet; Refill: 0    Abdominal pain, left lower quadrant  I suspect abdominal discomfort is related to epididymitis.  - CBC with platelets and differential  - UA Macro with Reflex to Micro and Culture - lab collect  - CBC with platelets and differential  - UA Macro with Reflex to Micro and Culture - lab collect    Numbness of left anterior thigh  No clear cause at time of exam.  No treatment recommended at this time, with patient advised to notify me if it does not improve over the next couple weeks.    Hyperglycemia  Hemoglobin A1c test was reassuring.  - Hemoglobin A1c        30 minutes spent on the date of the encounter doing chart review, history and exam, documentation and further activities per the note       BMI:   Estimated body mass index is 27.57 kg/m  as calculated from the following:    Height as of this encounter: 1.702 m (5' 7\").    Weight as of this encounter: 79.8 kg (176 lb).           Return in about 2 weeks (around 7/26/2021), or if symptoms worsen or fail to improve.    Andrei Schmid [facility] APURVA Tatum is a 66 year old who presents for the following health issues     HPI     Patient states he has lower abdominal pain. Was in the hospital and initially thought it was a UTI but test came back negative. Patient is now having pain in left leg and left groin. Unsure if pain is from a stretch when using leg. Patient has been having numbness in left leg recently.       Left shoulder pain with weakness. Has been feeling this way for a few months.           Review of Systems   Check in questions and answers reviewed. Patient is seen for chief concern of " "follow-up UTI treatment.  He was treated for urinary tract infections in the emergency department on 07/09/2021.    While in the ER, patient had severe pain in left lower back radiating to the left groin area.  This has improved with the oral cephalexin treatment started in the emergency department.    No new fevers or chills.    The past couple days, he has had pain and partial numbness in left proximal thigh region, and left testicular area numbness.     He was doing better symptomatically until last night, when he started to have left testicular area discomfort and because of this he took one of his oxycodone tablets prescribed in the emergency department.    No complaint of urinary urgency or dysuria.    No reported left lower extremity weakness.    Patient mention left shoulder discomfort at time of check-in.  It is not significant at time of exam.  He did not feel he needed evaluation at this time.    He was noted to have an elevated blood glucose level while evaluated in the emergency department.  He was agreeable to doing a diabetes screening test, hemoglobin A1c, rechecking his CBC study, and rechecking a urine study.        Objective    /62 (BP Location: Right arm, Patient Position: Sitting, Cuff Size: Adult Regular)   Pulse 64   Temp 98.6  F (37  C) (Oral)   Resp 18   Ht 1.702 m (5' 7\")   Wt 79.8 kg (176 lb)   BMI 27.57 kg/m    Body mass index is 27.57 kg/m .  Physical Exam   Vital signs reviewed.  Patient is in no acute appearing distress.  Breathing appears nonlabored.  Patient is alert and oriented ×3.  Patient is very pleasant, making good eye contact and responding with clear fluent speech.  He gets up and down from exam room chair and table without visible difficulty.    Heart: Heart rate is regular without murmur.    Lungs: Lungs are clear to auscultation with good airflow bilaterally.    Abdomen:  Abdomen is soft, nontender.  No palpable abnormal masses or organomegaly.  Bowel sounds " are normal.    Left hip exam: No areas of tenderness, no discomfort with passive abduction and abduction, and hip flexion and normal range of motion.    Left thigh exam: No areas of tenderness.  He does not have any thigh discomfort with left lower leg extension against resistance.  No discoloration noted. Standing range of motion testing showed no change in left thigh numbness with normal truncal flexion, extension, sidebending, or rotation.    Genital exam: Patient is tender over his left epididymis region, no testicular tenderness or palpable abnormality noted.  No palpable abnormality noted to the left epididymis.  No visible genital abnormality noted.  No inguinal hernia noted while standing during coughing.    Results for orders placed or performed in visit on 07/12/21   UA Macro with Reflex to Micro and Culture - lab collect     Status: Abnormal    Specimen: Urine, Midstream   Result Value Ref Range    Color Urine Yellow Colorless, Straw, Light Yellow, Yellow    Appearance Urine Clear Clear    Glucose Urine Negative Negative mg/dL    Bilirubin Urine Negative Negative    Ketones Urine Negative Negative mg/dL    Specific Gravity Urine 1.020 1.003 - 1.035    Blood Urine Negative Negative    pH Urine 7.5 (H) 5.0 - 7.0    Protein Albumin Urine Negative Negative mg/dL    Urobilinogen Urine 1.0 0.2, 1.0 E.U./dL    Nitrite Urine Negative Negative    Leukocyte Esterase Urine Negative Negative    Narrative    Microscopic not indicated   Hemoglobin A1c     Status: Normal   Result Value Ref Range    Hemoglobin A1C 5.3 0.0 - 5.6 %   CBC with platelets and differential     Status: None   Result Value Ref Range    WBC Count 7.0 4.0 - 11.0 10e3/uL    RBC Count 4.87 4.40 - 5.90 10e6/uL    Hemoglobin 15.3 13.3 - 17.7 g/dL    Hematocrit 46.6 40.0 - 53.0 %    MCV 96 78 - 100 fL    MCH 31.4 26.5 - 33.0 pg    MCHC 32.8 31.5 - 36.5 g/dL    RDW 13.0 10.0 - 15.0 %    Platelet Count 238 150 - 450 10e3/uL    % Neutrophils 68 %    %  Lymphocytes 19 %    % Monocytes 11 %    % Eosinophils 2 %    % Basophils 0 %    Absolute Neutrophils 4.8 1.6 - 8.3 10e3/uL    Absolute Lymphocytes 1.3 0.8 - 5.3 10e3/uL    Absolute Monocytes 0.8 0.0 - 1.3 10e3/uL    Absolute Eosinophils 0.2 0.0 - 0.7 10e3/uL    Absolute Basophils 0.0 0.0 - 0.2 10e3/uL   CBC with platelets and differential     Status: None    Narrative    The following orders were created for panel order CBC with platelets and differential.  Procedure                               Abnormality         Status                     ---------                               -----------         ------                     CBC with platelets and d...[[id number]]                      Final result                 Please view results for these tests on the individual orders.   Hemoglobin A1c lab result not available for review at time of exam.  Please see result note.  The urinalysis which showed improvement from study done in the emergency department reviewed with patient in exam, with decreased white blood cells and blood, and decreased leukocyte esterase noted.

## 2021-07-12 NOTE — PATIENT INSTRUCTIONS
If your symptoms are not improved or worsen while finishing the current cephalexin antibiotic treatment, fill and start taking the new antibiotic treatment prescribed today. Your urine test shows some improvement from your recent ER visit. If your periodic left thigh numbness continues, we can further discuss this at a future visit. It would be nice to do a full prevent health exam with fasting labs when you are feeling better.       Patient Education     Epididymitis   Inflammation of the epididymis can cause pain and swelling in your scrotum. The epididymis is a small tube next to the testicle that stores sperm. Epididymitis is often caused by an infection. In sexually active men, it is often caused by a sexually transmitted infection (STI) such as chlamydia or gonorrhea. In boys and in men over 40, it can be from bacteria from other parts of the urinary tract (not an STI infection).  Symptoms may begin with pain in the lower belly (abdomen) or low back. The pain then spreads down into the scrotum. Often only one side is affected. The testicle and scrotum swell and become very painful and red. You may have fever and a burning when passing urine. Sometimes you may have a discharge from the penis.  Treatment is with antibiotics, and anti-inflammatory and pain medicines. The condition should get better over the first few days of treatment. But it will take several weeks for all the swelling and mild pain to go away. If your healthcare provider thinks that an STI is the cause, your sexual partners may need to be treated.  Home care  Here are some tips to help you care for yourself at home:    Support the scrotum. When lying down, place a rolled towel under the scrotum. When walking, use an athletic supporter or 2 pairs of jockey-style underwear.    To ease pain, put ice packs on the inflamed area. To make an ice pack, put ice cubes in a plastic bag that seals at the top. Wrap the bag in a clean, thin towel. Never put  an ice pack directly on the skin.    Take pain medicine as directed. You may use over-the-counter medicines to control pain, unless another medicine was given. If you have long-term (chronic) liver or kidney disease, talk with your healthcare provider before taking these medicines. Also talk with your provider if you've ever had a stomach ulcer or GI (gastrointestinal) bleeding.    Get some rest.  Rest in bed for the first few days until the fever, pain, and swelling get better. It may take several weeks for all of the swelling to go away.    Prevent constipation. Constipation can make you strain. This makes the pain worse. Prevent constipation by eating natural laxatives. These include prunes, fresh fruits, and whole-grain cereals. If needed, use a mild over-the-counter laxative for constipation. Mineral oil can be used to keep the stools soft.    Wait to have sex. Don't have sex until you have finished all treatment and all symptoms have cleared.    Take all medicine as directed. Don't miss any doses. And don't stop taking your medicine early, even if you feel better.  Follow-up care  Follow up with your healthcare provider, or as advised, to be sure you are responding correctly to treatment. If a culture was taken, you may call for the result as directed. A culture test can ensure that you are on the correct antibiotic.   When to seek medical advice  Call your healthcare provider right away if any of these occur:    Fever of 100.4 F (38 C) or higher, or as directed by your provider    More pain or swelling of the testicle after starting treatment    Pressure or pain in your bladder that gets worse    Unable to pass urine for 8 hours  [employer] last reviewed this educational content on 9/1/2019 2000-2021 [employer]. All rights reserved. This information is not intended as a substitute for professional medical care. Always follow your healthcare professional's instructions.

## 2021-07-12 NOTE — LETTER
July 13, 2021      Sonu Holt  [id number] [address] [facility] [id number]        Dear ,    We are writing to inform you of your test results.    Your lab results from last clinic visit are reassuring, all being within normal limits or very close to normal limits with no new treatment needed from what was discussed at time of exam.  You are diabetes screening test was normal.     Please contact me if you have any further concerns or questions!      Resulted Orders   UA Macro with Reflex to Micro and Culture - lab collect   Result Value Ref Range    Color Urine Yellow Colorless, Straw, Light Yellow, Yellow    Appearance Urine Clear Clear    Glucose Urine Negative Negative mg/dL    Bilirubin Urine Negative Negative    Ketones Urine Negative Negative mg/dL    Specific Gravity Urine 1.020 1.003 - 1.035    Blood Urine Negative Negative    pH Urine 7.5 (H) 5.0 - 7.0    Protein Albumin Urine Negative Negative mg/dL    Urobilinogen Urine 1.0 0.2, 1.0 E.U./dL    Nitrite Urine Negative Negative    Leukocyte Esterase Urine Negative Negative    Narrative    Microscopic not indicated   Hemoglobin A1c   Result Value Ref Range    Hemoglobin A1C 5.3 0.0 - 5.6 %      Comment:      Normal <5.7%   Prediabetes 5.7-6.4%    Diabetes 6.5% or higher     Note: Adopted from ADA consensus guidelines.   CBC with platelets and differential   Result Value Ref Range    WBC Count 7.0 4.0 - 11.0 10e3/uL    RBC Count 4.87 4.40 - 5.90 10e6/uL    Hemoglobin 15.3 13.3 - 17.7 g/dL    Hematocrit 46.6 40.0 - 53.0 %    MCV 96 78 - 100 fL    MCH 31.4 26.5 - 33.0 pg    MCHC 32.8 31.5 - 36.5 g/dL    RDW 13.0 10.0 - 15.0 %    Platelet Count 238 150 - 450 10e3/uL    % Neutrophils 68 %    % Lymphocytes 19 %    % Monocytes 11 %    % Eosinophils 2 %    % Basophils 0 %    Absolute Neutrophils 4.8 1.6 - 8.3 10e3/uL    Absolute Lymphocytes 1.3 0.8 - 5.3 10e3/uL    Absolute Monocytes 0.8 0.0 - 1.3 10e3/uL    Absolute Eosinophils 0.2 0.0 - 0.7 10e3/uL     Absolute Basophils 0.0 0.0 - 0.2 10e3/uL       If you have any questions or concerns, please call the clinic at the number listed above.       Sincerely,      Andrei Schmid, DO

## 2021-07-12 NOTE — TELEPHONE ENCOUNTER
"Sonu reports pain and numbness in left scrotum and left leg, which he has on and off for \"quite some time\" but was worst last night. Woke him up from sleep, had to take his last pill of oxycontin and also took ibuprofen.      He states the pain would be present for about 15 minutes, then will go away for some time. Pain recurs if he does some activities.    He is feeling fine at this time, able to perform usual activities, has mild pain rated 3/10.    No fever. No left leg swelling, no leg discoloration.    Per protocol, advised clinic visit today. Care advice reviewed. Patient verbalizes understanding. Advised to call back with further questions/concerns.     Veronica Murrieta RN/[place] Nurse Advisor        Reason for Disposition    Pain comes and goes (intermittent) and present > 24 hours    Numbness in a leg or foot (i.e., loss of sensation)    Additional Information    Negative: Looks like a broken bone or dislocated joint (e.g., crooked or deformed)    Negative: Sounds like a life-threatening emergency to the triager    Negative: Chest pain    Negative: Difficulty breathing    Negative: Entire foot is cool or blue in comparison to other side    Negative: Unable to walk    Negative: SEVERE pain (e.g., excruciating)    Negative: Swollen scrotum    Negative: Constant pain in scrotum or testicle and present > 1 hour    Negative: Vomiting    Negative: Fever > 100.4 F (38.0 C)    Negative: Patient sounds very sick or weak to the triager    Negative: Scrotum looks infected (e.g., draining sore, ulcer, red rash)    Negative: Blood in urine (red, pink, or tea-colored)    Negative: Fever and red area (or area very tender to touch)    Negative: Fever and swollen joint    Negative: Thigh or calf pain in only one leg and present > 1 hour    Negative: Thigh, calf, or ankle swelling in only one leg    Negative: Thigh, calf, or ankle swelling in both legs, but one side is definitely more swollen    Negative: History of prior " 'blood clot' in leg or lungs (i.e., deep vein thrombosis, pulmonary embolism)    Negative: History of inherited increased risk of blood clots (e.g., factor 5 Leiden, antithrombin 3, protein C or protein S deficiency, prothrombin mutation)    Negative: Major surgery in the past month    Negative: Hip or leg fracture (broken bone) in past month (or had cast on leg or ankle in past month)    Negative: Illness requiring prolonged bedrest in past month (e.g., immobilization, long hospital stay)    Negative: Long-distance travel in past month (e.g., car, bus, train, plane; with trip lasting 6 or more hours)    Negative: Cancer treatment in the past two months (or has cancer now)    Negative: Patient sounds very sick or weak to the triager    Negative: SEVERE pain (e.g., excruciating, unable to do any normal activities)    Negative: Cast on leg or ankle and now has increasing pain    Negative: Red area or streak and large (> 2 in. or 5 cm)    Negative: Painful rash with multiple small blisters grouped together (i.e., dermatomal distribution or 'band' or 'stripe')    Negative: Looks like a boil, infected sore, deep ulcer, or other infected rash (spreading redness, pus)    Negative: Localized rash is very painful (no fever)    Protocols used: SCROTAL PAIN-A-OH, LEG PAIN-A-OH

## 2023-01-06 ENCOUNTER — TELEPHONE (OUTPATIENT)
Dept: FAMILY MEDICINE | Facility: CLINIC | Age: 68
End: 2023-01-06

## 2023-01-06 NOTE — TELEPHONE ENCOUNTER
Summary:    Patient is due/failing the following:   Annual wellness visit    Reviewed:    [] CARE EVERYWHERE  [] LAST OV NOTE   [] FYI TAB  [] MYCHART ACTIVE?  [] LAST PANEL ENCOUNTER  [] FUTURE APPTS  [] IMMUNIZATIONS  [] Media Tab            Action needed:   Patient needs office visit for annual wellness visit.    Type of outreach:    Phone, left message for patient to call back.                                                                                Anay Rivera/LETI  [place]---[facility]

## 2023-02-10 ENCOUNTER — OFFICE VISIT (OUTPATIENT)
Dept: FAMILY MEDICINE | Facility: CLINIC | Age: 68
End: 2023-02-10
Payer: MEDICARE

## 2023-02-10 VITALS
RESPIRATION RATE: 16 BRPM | HEIGHT: 67 IN | DIASTOLIC BLOOD PRESSURE: 68 MMHG | TEMPERATURE: 97.3 F | OXYGEN SATURATION: 98 % | SYSTOLIC BLOOD PRESSURE: 118 MMHG | BODY MASS INDEX: 27.62 KG/M2 | HEART RATE: 59 BPM | WEIGHT: 176 LBS

## 2023-02-10 DIAGNOSIS — Z00.00 ENCOUNTER FOR PREVENTATIVE ADULT HEALTH CARE EXAMINATION: ICD-10-CM

## 2023-02-10 DIAGNOSIS — Z13.220 SCREENING FOR HYPERLIPIDEMIA: ICD-10-CM

## 2023-02-10 DIAGNOSIS — Z00.00 ENCOUNTER FOR MEDICARE ANNUAL WELLNESS EXAM: ICD-10-CM

## 2023-02-10 PROCEDURE — 99207 PR INITIAL PREVENTIVE EXAM STAT: CPT | Performed by: FAMILY MEDICINE

## 2023-02-10 SDOH — ECONOMIC STABILITY: INCOME INSECURITY: IN THE LAST 12 MONTHS, WAS THERE A TIME WHEN YOU WERE NOT ABLE TO PAY THE MORTGAGE OR RENT ON TIME?: NO

## 2023-02-10 SDOH — ECONOMIC STABILITY: TRANSPORTATION INSECURITY
IN THE PAST 12 MONTHS, HAS LACK OF TRANSPORTATION KEPT YOU FROM MEETINGS, WORK, OR FROM GETTING THINGS NEEDED FOR DAILY LIVING?: NO

## 2023-02-10 SDOH — ECONOMIC STABILITY: FOOD INSECURITY: WITHIN THE PAST 12 MONTHS, THE FOOD YOU BOUGHT JUST DIDN'T LAST AND YOU DIDN'T HAVE MONEY TO GET MORE.: NEVER TRUE

## 2023-02-10 SDOH — HEALTH STABILITY: PHYSICAL HEALTH: ON AVERAGE, HOW MANY DAYS PER WEEK DO YOU ENGAGE IN MODERATE TO STRENUOUS EXERCISE (LIKE A BRISK WALK)?: 7 DAYS

## 2023-02-10 SDOH — ECONOMIC STABILITY: FOOD INSECURITY: WITHIN THE PAST 12 MONTHS, YOU WORRIED THAT YOUR FOOD WOULD RUN OUT BEFORE YOU GOT MONEY TO BUY MORE.: NEVER TRUE

## 2023-02-10 SDOH — ECONOMIC STABILITY: INCOME INSECURITY: HOW HARD IS IT FOR YOU TO PAY FOR THE VERY BASICS LIKE FOOD, HOUSING, MEDICAL CARE, AND HEATING?: NOT HARD AT ALL

## 2023-02-10 SDOH — HEALTH STABILITY: PHYSICAL HEALTH: ON AVERAGE, HOW MANY MINUTES DO YOU ENGAGE IN EXERCISE AT THIS LEVEL?: 150+ MIN

## 2023-02-10 SDOH — ECONOMIC STABILITY: TRANSPORTATION INSECURITY
IN THE PAST 12 MONTHS, HAS THE LACK OF TRANSPORTATION KEPT YOU FROM MEDICAL APPOINTMENTS OR FROM GETTING MEDICATIONS?: NO

## 2023-02-10 ASSESSMENT — SOCIAL DETERMINANTS OF HEALTH (SDOH)
DO YOU BELONG TO ANY CLUBS OR ORGANIZATIONS SUCH AS CHURCH GROUPS UNIONS, FRATERNAL OR ATHLETIC GROUPS, OR SCHOOL GROUPS?: NO
HOW OFTEN DO YOU GET TOGETHER WITH FRIENDS OR RELATIVES?: MORE THAN THREE TIMES A WEEK
IN A TYPICAL WEEK, HOW MANY TIMES DO YOU TALK ON THE PHONE WITH FAMILY, FRIENDS, OR NEIGHBORS?: ONCE A WEEK
HOW OFTEN DO YOU ATTEND CHURCH OR RELIGIOUS SERVICES?: NEVER

## 2023-02-10 ASSESSMENT — LIFESTYLE VARIABLES
HOW MANY STANDARD DRINKS CONTAINING ALCOHOL DO YOU HAVE ON A TYPICAL DAY: 1 OR 2
HOW OFTEN DO YOU HAVE SIX OR MORE DRINKS ON ONE OCCASION: LESS THAN MONTHLY
SKIP TO QUESTIONS 9-10: 0
HOW OFTEN DO YOU HAVE A DRINK CONTAINING ALCOHOL: 2-3 TIMES A WEEK
AUDIT-C TOTAL SCORE: 4

## 2023-02-10 ASSESSMENT — ENCOUNTER SYMPTOMS
DIARRHEA: 0
FEVER: 0
NERVOUS/ANXIOUS: 0
CONSTIPATION: 0
DIZZINESS: 0
HEMATOCHEZIA: 0
HEADACHES: 0
HEARTBURN: 0
PALPITATIONS: 0
DYSURIA: 0
MYALGIAS: 1
ARTHRALGIAS: 1
JOINT SWELLING: 0
WEAKNESS: 0
SHORTNESS OF BREATH: 0
HEMATURIA: 0
FREQUENCY: 0
PARESTHESIAS: 0
CHILLS: 0
ABDOMINAL PAIN: 0
COUGH: 0
NAUSEA: 0
EYE PAIN: 0
SORE THROAT: 0

## 2023-02-10 ASSESSMENT — ACTIVITIES OF DAILY LIVING (ADL): CURRENT_FUNCTION: NO ASSISTANCE NEEDED

## 2023-02-10 NOTE — PATIENT INSTRUCTIONS
Good to see you again! See you in about a year if no problems before then.     Patient Education   Personalized Prevention Plan  You are due for the preventive services outlined below.  Your care team is available to assist you in scheduling these services.  If you have already completed any of these items, please share that information with your care team to update in your medical record.  Health Maintenance Due   Topic Date Due    ANNUAL REVIEW OF HM ORDERS  Never done    COVID-19 Vaccine (1) Never done    Hepatitis C Screening  Never done    Cholesterol Lab  Never done    Zoster (Shingles) Vaccine (1 of 2) Never done    Annual Wellness Visit  Never done    Pneumococcal Vaccine (1 - PCV) Never done    Flu Vaccine (1) 09/01/2022

## 2023-02-10 NOTE — PROGRESS NOTES
"SUBJECTIVE:   Rc is a 67 year old who presents for Preventive Visit.    Patient has been advised of split billing requirements and indicates understanding: Yes  Are you in the first 12 months of your Medicare coverage?  Patient denied eye test. Has been to eye Dr. recently    Healthy Habits:     In general, how would you rate your overall health?  Good    Frequency of exercise:  None    Do you usually eat at least 4 servings of fruit and vegetables a day, include whole grains    & fiber and avoid regularly eating high fat or \"junk\" foods?  No    Taking medications regularly:  No    Barriers to taking medications:  None    Medication side effects:  Not applicable    Ability to successfully perform activities of daily living:  No assistance needed    Home Safety:  No safety concerns identified    Hearing Impairment:  Difficulty following a conversation in a noisy restaurant or crowded room    In the past 6 months, have you been bothered by leaking of urine?  No    In general, how would you rate your overall mental or emotional health?  Good      PHQ-2 Total Score: 0    Additional concerns today:  No      Have you ever done Advance Care Planning? (For example, a Health Directive, POLST, or a discussion with a medical provider or your loved ones about your wishes): No, advance care planning information given to patient to review.  Advanced care planning was discussed at today's visit.       Fall risk  Fallen 2 or more times in the past year?: No  Any fall with injury in the past year?: No    Cognitive Screening   1) Repeat 3 items (Leader, Season, Table)    2) Clock draw: Good  3) 3 item recall: Recalls 3 objects  Results: 3 items recalled: COGNITIVE IMPAIRMENT LESS LIKELY    Mini-CogTM Copyright MARIA DEL CARMEN Graham. Licensed by the author for use in [facility]; reprinted with permission (hilda@.[email or web address]). All rights reserved.          Reviewed and updated as needed this visit by clinical staff   Tobacco  Allergies  " [facility]          Reviewed and updated as needed this visit by Provider     Meds     [facility] Hx         Social History     Tobacco Use     Smoking status: Never     Smokeless tobacco: Never   Substance Use Topics     Alcohol use: Yes     Comment: a dozen beers a week.     If you drink alcohol do you typically have >3 drinks per day or >7 drinks per week? No    Alcohol Use 2/10/2023   Prescreen: >3 drinks/day or >7 drinks/week? Not Applicable   Prescreen: >3 drinks/day or >7 drinks/week? -   No flowsheet data found.            Current providers sharing in care for this patient include:   Patient Care Team:  Andrei Schmid DO as PCP - General (Family Medicine)  Andrei Schmid DO as Assigned PCP    The following health maintenance items are reviewed in Epic and correct as of today:  Health Maintenance   Topic Date Due     ANNUAL REVIEW OF  ORDERS  Never done     LIPID  Never done     MEDICARE ANNUAL WELLNESS VISIT  02/10/2024     FALL RISK ASSESSMENT  02/10/2024     ADVANCE CARE PLANNING  04/07/2025     DTAP/TDAP/TD IMMUNIZATION (2 - Td or Tdap) 07/07/2027     COLORECTAL CANCER SCREENING  08/22/2027     PHQ-2 (once per calendar year)  Completed     AORTIC ANEURYSM SCREENING (SYSTEM ASSIGNED)  Completed     IPV IMMUNIZATION  Aged Out     MENINGITIS IMMUNIZATION  Aged Out     HEPATITIS C SCREENING  Discontinued     INFLUENZA VACCINE  Discontinued     Pneumococcal Vaccine: 65+ Years  Discontinued     ZOSTER IMMUNIZATION  Discontinued     COVID-19 Vaccine  Discontinued               Review of Systems   Constitutional: Negative for chills and fever.   HENT: Negative for congestion, ear pain, hearing loss and sore throat.    Eyes: Negative for pain and visual disturbance.   Respiratory: Negative for cough and shortness of breath.    Cardiovascular: Negative for chest pain, palpitations and peripheral edema.   Gastrointestinal: Negative for abdominal pain, constipation, diarrhea, heartburn, hematochezia and nausea.  "  Genitourinary: Negative for dysuria, frequency, genital sores, hematuria, impotence, penile discharge and urgency.   Musculoskeletal: Positive for arthralgias and myalgias. Negative for joint swelling.   Skin: Negative for rash.   Neurological: Negative for dizziness, weakness, headaches and paresthesias.   Psychiatric/Behavioral: Negative for mood changes. The patient is not nervous/anxious.      No severe muscle and joint aches. Gets better with movement in the morning.     Is working in auto body repair business.         OBJECTIVE:   /68 (BP Location: Right leg, Patient Position: Sitting, Cuff Size: Adult Large)   Pulse 59   Temp 97.3  F (36.3  C) (Tympanic)   Resp 16   Ht 1.702 m (5' 7\")   Wt 79.8 kg (176 lb)   SpO2 98%   BMI 27.57 kg/m   Estimated body mass index is 27.57 kg/m  as calculated from the following:    Height as of this encounter: 1.702 m (5' 7\").    Weight as of this encounter: 79.8 kg (176 lb).  Physical Exam  General: Vital signs reviewed.  Patient is in no acute appearing distress.  Breathing appears nonlabored.  Patient is alert and oriented ×3.      ENT: Ear exam shows bilateral tympanic membranes to be clear without injection, nasal turbinates show no injection or edema, no pharyngeal injection or exudate.    Neck: supple with no adenoapthy, palpable abnormal masses, or thyroid abnormality.    Eyes: No scleral, lid, or periorbital injection or edema noted.  No eye mattering noted.  Corneas are clear. Pupils are equal round and reactive to light with normal consensual eye movement.    Heart: Heart rate is regular without murmur.    Lungs: Lungs are clear to auscultation with good airflow bilaterally.    Abdomen:  Abdomen is soft, nontender.  No palpable abnormal masses or organomegaly.  Bowel sounds are normal.    Genital exam: Patient declined exam for possible hernia.    Back: No areas of tenderness.    Skin: Warm and dry, with no rash or abnormal lesions " noted.    Extremities: No lower leg edema noted.  No joint edema or restricted range of motion noted.    Neuro: No acute focal deficits or other abnormalities noted.    Psych: Patient is very pleasant, making good eye contact, with clear and fluent speech.  Answers questions appropriately. No psychomotor agitation.     Diagnostic Test Results:  Labs reviewed in Epic    ASSESSMENT / PLAN:   Sonu was seen today for wellness visit.    Diagnoses and all orders for this visit:  See after visit summary and result note from studies for helpful information and advice given to patient.    Encounter for Medicare annual wellness exam    Encounter for preventative adult health care examination  -     Basic metabolic panel    Screening for hyperlipidemia  -     Lipid Profile            COUNSELING:  Reviewed preventive health counseling, as reflected in patient instructions        He reports that he has never smoked. He has never used smokeless tobacco.      Appropriate preventive services were discussed with this patient, including applicable screening as appropriate for cardiovascular disease, diabetes, osteopenia/osteoporosis, and glaucoma.  As appropriate for age/gender, discussed screening for colorectal cancer, prostate cancer, breast cancer, and cervical cancer. Checklist reviewing preventive services available has been given to the patient.    Reviewed patients plan of care and provided an AVS. The Basic Care Plan (routine screening as documented in Health Maintenance) for Sonu meets the Care Plan requirement. This Care Plan has been established and reviewed with the Patient.          Andrei Schmid DO  [facility]